# Patient Record
(demographics unavailable — no encounter records)

---

## 2024-10-30 NOTE — HISTORY OF PRESENT ILLNESS
[FreeTextEntry1] : Prior AFIB had prior TEECV now on beta blocker and noac has baseline abnl ekg ischemic w/u neg in past  doing well walking at 14 min/ mile pace  Prior WBC elevation refused Heme eval DM controlled on meds  9/30/2020 feels well  is here for vaccines  no sscp or heart  recent hgaic 6.5 11/20/2020 feels well no arrhythmia or heart  Has baseline abnl ekg ? prior cardiomyopathy  Prior stress tets neg for CAD  4/21/21 doing well  returned from [facility] no arrhythmia or sscp  good sleep pattern  Had 2 vaccine for covid no remarkable side affects  12/1/21  no new complaints  feels well no excess salt no headache or arrhythmias feels depressed in AM better w exercise  wbc elevated in past has declined heme evaluation 4/27/22  returned from [facility]  bp controlled while away  has no arrhythmia  good effort tolerance 2 hrs walking  11/9/22  feesl well no arrhythmia  no sscp or evette  feels well  mod shin pain w  05/17/23 no sscp or heart no angia  baseline T wavre abnl on ekg  10/18/23 PAF in past no recurrent arrhythmia  on meds for AODM and on DOAC meds  recent GI illness w nausea and diarrhea and chills  most symptoms resolved still has loose BM  mild fatique and weakness  Finds his effort capacity is not on par  was seen by GI was told to get CF  CF pending next week  was guaic pos  5/15/24   PAF on meds  Aodm on meds  remains physically active  seen by HEME for elevated WBC no signs of CLL  no angina or paf   10/30/24 poor concentration  less stamina diminished sense of smell taste  wakes up depressed  is able to exercise regularly  does power walking occ imbalance had 1 prior fall in gym locker  no sscp or heart  is able exercise regularly

## 2024-10-30 NOTE — ADDENDUM
[FreeTextEntry1] : Dallin Walton assisted in documentation on Oct 30 2024 acting as a scribe for Dr. Ford Agarwal.

## 2024-10-30 NOTE — DISCUSSION/SUMMARY
[FreeTextEntry1] : Atrial Fibrillation: The impression is paroxysmal atrial fibrillation. Currently, this condition is stable. Management strategy includes rate control, rhythm control and anticoagulation. Medication management includes continuing beta blockers and continuing anticoagulants. The plan was discussed with the patient.  Hypertension: The impression is hypertension. Currently, the condition is stable and responding to treatment. Medication changes are as documented in orders. Medication management includes continuing angiotensin receptor blockers and continuing beta blockers. Other planned treatment includes an exercise regimen, low sodium diet and non-steroidal anti-inflammatory drugs avoidance. The plan was discussed with the patient.  AODM meds discussed targat Hgaic < 7  target LDL <70  [EKG obtained to assist in diagnosis and management of assessed problem(s)] : EKG obtained to assist in diagnosis and management of assessed problem(s)

## 2024-10-30 NOTE — PHYSICAL EXAM
[Well Developed] : well developed [Well Nourished] : well nourished [No Acute Distress] : no acute distress [Normal Conjunctiva] : normal conjunctiva [Normal Venous Pressure] : normal venous pressure [No Carotid Bruit] : no carotid bruit [Normal S1, S2] : normal S1, S2 [No Murmur] : no murmur [No Rub] : no rub [No Gallop] : no gallop [Clear Lung Fields] : clear lung fields [Good Air Entry] : good air entry [No Respiratory Distress] : no respiratory distress  [Soft] : abdomen soft [Non Tender] : non-tender [No Masses/organomegaly] : no masses/organomegaly [Normal Bowel Sounds] : normal bowel sounds [Normal Gait] : normal gait [No Edema] : no edema [No Cyanosis] : no cyanosis [No Clubbing] : no clubbing [No Varicosities] : no varicosities [No Rash] : no rash [No Skin Lesions] : no skin lesions [Moves all extremities] : moves all extremities [No Focal Deficits] : no focal deficits [Normal Speech] : normal speech [Alert and Oriented] : alert and oriented [Normal memory] : normal memory [de-identified] : UNABLE TO HEAL TOENEG  WALK NO ROMBERG

## 2024-10-30 NOTE — ASSESSMENT
[FreeTextEntry1] : Lifestyle changes for control of BP reviewed ie wgt reduction, salt restriction, Etoh avoidance, exercise 30 min aerobic activity per day, avoid NSAID use self monitor BP TIW Lifestyle advice for LDL reduction including reduction of red meat consumption concentrating on a plant based diet. 30 min aerobic exercise per day. Calorie reduction to target BMI<25  Echo Lvef 55 apical hypokinesis apical myopathy  dilated LA  carotid no plaque  Has gait abnl you rec MRI of head

## 2025-05-19 NOTE — HISTORY OF PRESENT ILLNESS
[FreeTextEntry1] : Prior AFIB had prior TEECV now on beta blocker and noac has baseline abnl ekg ischemic w/u neg in past  doing well walking at 14 min/ mile pace  Prior WBC elevation refused Heme eval DM controlled on meds  9/30/2020 feels well  is here for vaccines  no sscp or heart  recent hgaic 6.5 11/20/2020 feels well no arrhythmia or heart  Has baseline abnl ekg ? prior cardiomyopathy  Prior stress tets neg for CAD  4/21/21 doing well  returned from [facility] no arrhythmia or sscp  good sleep pattern  Had 2 vaccine for covid no remarkable side affects  12/1/21  no new complaints  feels well no excess salt no headache or arrhythmias feels depressed in AM better w exercise  wbc elevated in past has declined heme evaluation 4/27/22  returned from [facility]  bp controlled while away  has no arrhythmia  good effort tolerance 2 hrs walking  11/9/22  feesl well no arrhythmia  no sscp or evette  feels well  mod shin pain w  05/17/23 no sscp or heart no angia  baseline T wavre abnl on ekg  10/18/23 PAF in past no recurrent arrhythmia  on meds for AODM and on DOAC meds  recent GI illness w nausea and diarrhea and chills  most symptoms resolved still has loose BM  mild fatique and weakness  Finds his effort capacity is not on par  was seen by GI was told to get CF  CF pending next week  was guaic pos  5/15/24   PAF on meds  Aodm on meds  remains physically active  seen by HEME for elevated WBC no signs of CLL  no angina or paf   10/30/24 poor concentration  less stamina diminished sense of smell taste  wakes up depressed  is able to exercise regularly  does power walking occ imbalance had 1 prior fall in gym locker  no sscp or heart  is able exercise regularly  5/14/25 mild ataxia  when waking able to walk at fast speed  No angina no heart  no arrhythmias

## 2025-05-19 NOTE — PHYSICAL EXAM
[Well Developed] : well developed [Well Nourished] : well nourished [No Acute Distress] : no acute distress [Normal Conjunctiva] : normal conjunctiva [Normal Venous Pressure] : normal venous pressure [No Carotid Bruit] : no carotid bruit [Normal S1, S2] : normal S1, S2 [No Murmur] : no murmur [No Rub] : no rub [No Gallop] : no gallop [Clear Lung Fields] : clear lung fields [Good Air Entry] : good air entry [No Respiratory Distress] : no respiratory distress  [Soft] : abdomen soft [Non Tender] : non-tender [No Masses/organomegaly] : no masses/organomegaly [Normal Bowel Sounds] : normal bowel sounds [Normal Gait] : normal gait [No Edema] : no edema [No Cyanosis] : no cyanosis [No Clubbing] : no clubbing [No Varicosities] : no varicosities [No Rash] : no rash [No Skin Lesions] : no skin lesions [Moves all extremities] : moves all extremities [No Focal Deficits] : no focal deficits [Normal Speech] : normal speech [Alert and Oriented] : alert and oriented [Normal memory] : normal memory [de-identified] : UNABLE TO HEAL TOENEG  WALK mild romber

## 2025-05-19 NOTE — ADDENDUM
[FreeTextEntry1] : Dallin Walton assisted in documentation on May 14 2025 acting as a scribe for Dr. Ford Agarwal.

## 2025-05-19 NOTE — PHYSICAL EXAM
[Well Developed] : well developed [Well Nourished] : well nourished [No Acute Distress] : no acute distress [Normal Conjunctiva] : normal conjunctiva [Normal Venous Pressure] : normal venous pressure [No Carotid Bruit] : no carotid bruit [Normal S1, S2] : normal S1, S2 [No Murmur] : no murmur [No Rub] : no rub [No Gallop] : no gallop [Clear Lung Fields] : clear lung fields [Good Air Entry] : good air entry [No Respiratory Distress] : no respiratory distress  [Soft] : abdomen soft [Non Tender] : non-tender [No Masses/organomegaly] : no masses/organomegaly [Normal Bowel Sounds] : normal bowel sounds [Normal Gait] : normal gait [No Edema] : no edema [No Cyanosis] : no cyanosis [No Clubbing] : no clubbing [No Varicosities] : no varicosities [No Rash] : no rash [No Skin Lesions] : no skin lesions [Moves all extremities] : moves all extremities [No Focal Deficits] : no focal deficits [Normal Speech] : normal speech [Alert and Oriented] : alert and oriented [Normal memory] : normal memory [de-identified] : UNABLE TO HEAL TOENEG  WALK mild romber

## 2025-05-19 NOTE — PHYSICAL EXAM
[Well Developed] : well developed [Well Nourished] : well nourished [No Acute Distress] : no acute distress [Normal Conjunctiva] : normal conjunctiva [Normal Venous Pressure] : normal venous pressure [No Carotid Bruit] : no carotid bruit [Normal S1, S2] : normal S1, S2 [No Murmur] : no murmur [No Rub] : no rub [No Gallop] : no gallop [Clear Lung Fields] : clear lung fields [Good Air Entry] : good air entry [No Respiratory Distress] : no respiratory distress  [Soft] : abdomen soft [Non Tender] : non-tender [No Masses/organomegaly] : no masses/organomegaly [Normal Bowel Sounds] : normal bowel sounds [Normal Gait] : normal gait [No Edema] : no edema [No Cyanosis] : no cyanosis [No Clubbing] : no clubbing [No Varicosities] : no varicosities [No Rash] : no rash [No Skin Lesions] : no skin lesions [Moves all extremities] : moves all extremities [No Focal Deficits] : no focal deficits [Normal Speech] : normal speech [Alert and Oriented] : alert and oriented [Normal memory] : normal memory [de-identified] : UNABLE TO HEAL TOENEG  WALK mild romber

## 2025-05-19 NOTE — HISTORY OF PRESENT ILLNESS
[FreeTextEntry1] : Prior AFIB had prior TEECV now on beta blocker and noac has baseline abnl ekg ischemic w/u neg in past  doing well walking at 14 min/ mile pace  Prior WBC elevation refused Heme eval DM controlled on meds  9/30/2020 feels well  is here for vaccines  no sscp or heart  recent hgaic 6.5 11/20/2020 feels well no arrhythmia or heart  Has baseline abnl ekg ? prior cardiomyopathy  Prior stress tets neg for CAD  4/21/21 doing well  returned from [facility] no arrhythmia or sscp  good sleep pattern  Had 2 vaccine for covid no remarkable side affects  12/1/21  no new complaints  feels well no excess salt no headache or arrhythmias feels depressed in AM better w exercise  wbc elevated in past has declined heme evaluation 4/27/22  returned from [facility]  bp controlled while away  has no arrhythmia  good effort tolerance 2 hrs walking  11/9/22  feesl well no arrhythmia  no sscp or evette  feels well  mod shin pain w  05/17/23 no sscp or heart no angia  baseline T wavre abnl on ekg  10/18/23 PAF in past no recurrent arrhythmia  on meds for AODM and on DOAC meds  recent GI illness w nausea and diarrhea and chills  most symptoms resolved still has loose BM  mild fatique and weakness  Finds his effort capacity is not on par  was seen by GI was told to get CF  CF pending next week  was guaic pos  5/15/24   PAF on meds  Aodm on meds  remains physically active  seen by HEME for elevated WBC no signs of CLL  no angina or paf   10/30/24 poor concentration  less stamina diminished sense of smell taste  wakes up depressed  is able to exercise regularly  does power walking occ imbalance had 1 prior fall in gym locker  no sscp or heart  is able exercise regularly  5/14/25 mild ataxia  when waking able to walk at fast speed  No angina no heart  no arrhythmias

## 2025-05-19 NOTE — HISTORY OF PRESENT ILLNESS
[FreeTextEntry1] : Prior AFIB had prior TEECV now on beta blocker and noac has baseline abnl ekg ischemic w/u neg in past  doing well walking at 14 min/ mile pace  Prior WBC elevation refused Heme eval DM controlled on meds  9/30/2020 feels well  is here for vaccines  no sscp or heart  recent hgaic 6.5 11/20/2020 feels well no arrhythmia or heart  Has baseline abnl ekg ? prior cardiomyopathy  Prior stress tets neg for CAD  4/21/21 doing well  returned from [facility] no arrhythmia or sscp  good sleep pattern  Had 2 vaccine for covid no remarkable side affects  12/1/21  no new complaints  feels well no excess salt no headache or arrhythmias feels depressed in AM better w exercise  wbc elevated in past has declined heme evaluation 4/27/22  returned from [facility]  bp controlled while away  has no arrhythmia  good effort tolerance 2 hrs walking  11/9/22  feesl well no arrhythmia  no sscp or evette  feels well  mod shin pain w  05/17/23 no sscp or heart no angia  baseline T wavre abnl on ekg  10/18/23 PAF in past no recurrent arrhythmia  on meds for AODM and on DOAC meds  recent GI illness w nausea and diarrhea and chills  most symptoms resolved still has loose BM  mild fatique and weakness  Finds his effort capacity is not on par  was seen by GI was told to get CF  CF pending next week  was guaic pos  5/15/24   PAF on meds  Aodm on meds  remains physically active  seen by [facility]EME for elevated WBC no signs of CLL  no angina or paf   10/30/24 poor concentration  less stamina diminished sense of smell taste  wakes up depressed  is able to exercise regularly  does power walking occ imbalance had 1 prior fall in gym locker  no sscp or heart  is able exercise regularly  5/14/25 mild ataxia  when waking able to walk at fast speed  No angina no heart  no arrhythmias

## 2025-05-19 NOTE — HISTORY OF PRESENT ILLNESS
[FreeTextEntry1] : Prior AFIB had prior TEECV now on beta blocker and noac has baseline abnl ekg ischemic w/u neg in past  doing well walking at 14 min/ mile pace  Prior WBC elevation refused Heme eval DM controlled on meds  9/30/2020 feels well  is here for vaccines  no sscp or heart  recent hgaic 6.5 11/20/2020 feels well no arrhythmia or heart  Has baseline abnl ekg ? prior cardiomyopathy  Prior stress tets neg for CAD  4/21/21 doing well  returned from [facility] no arrhythmia or sscp  good sleep pattern  Had 2 vaccine for covid no remarkable side affects  12/1/21  no new complaints  feels well no excess salt no headache or arrhythmias feels depressed in AM better w exercise  wbc elevated in past has declined heme evaluation 4/27/22  returned from [facility]  bp controlled while away  has no arrhythmia  good effort tolerance 2 hrs walking  11/9/22  feesl well no arrhythmia  no sscp or evette  feels well  mod shin pain w  05/17/23 no sscp or heart no angia  baseline T wavre abnl on ekg  10/18/23 PAF in past no recurrent arrhythmia  on meds for AODM and on DOAC meds  recent GI illness w nausea and diarrhea and chills  most symptoms resolved still has loose BM  mild fatique and weakness  Finds his effort capacity is not on par  was seen by GI was told to get CF  CF pending next week  was guaic pos  5/15/24   PAF on meds  Aodm on meds  remains physically active  seen by HEME for elevated WBC no signs of CLL  no angina or paf   10/30/24 poor concentration  less stamina diminished sense of smell taste  wakes up depressed  is able to exercise regularly  does power walking occ imbalance had 1 prior fall in gym locker  no sscp or heart  is able exercise regularly  5/14/25 mild ataxia  when waking able to walk at fast speed  No angina no heart  no arrhythmias

## 2025-05-19 NOTE — PHYSICAL EXAM
[Well Developed] : well developed [Well Nourished] : well nourished [No Acute Distress] : no acute distress [Normal Conjunctiva] : normal conjunctiva [Normal Venous Pressure] : normal venous pressure [No Carotid Bruit] : no carotid bruit [Normal S1, S2] : normal S1, S2 [No Murmur] : no murmur [No Rub] : no rub [No Gallop] : no gallop [Clear Lung Fields] : clear lung fields [Good Air Entry] : good air entry [No Respiratory Distress] : no respiratory distress  [Soft] : abdomen soft [Non Tender] : non-tender [No Masses/organomegaly] : no masses/organomegaly [Normal Bowel Sounds] : normal bowel sounds [Normal Gait] : normal gait [No Edema] : no edema [No Cyanosis] : no cyanosis [No Clubbing] : no clubbing [No Varicosities] : no varicosities [No Rash] : no rash [No Skin Lesions] : no skin lesions [Moves all extremities] : moves all extremities [No Focal Deficits] : no focal deficits [Normal Speech] : normal speech [Alert and Oriented] : alert and oriented [Normal memory] : normal memory [de-identified] : UNABLE TO HEAL TOENEG  WALK mild romber